# Patient Record
Sex: FEMALE | Race: WHITE | NOT HISPANIC OR LATINO | URBAN - METROPOLITAN AREA
[De-identification: names, ages, dates, MRNs, and addresses within clinical notes are randomized per-mention and may not be internally consistent; named-entity substitution may affect disease eponyms.]

---

## 2023-10-05 NOTE — ASU PATIENT PROFILE, ADULT - FALL HARM RISK - ATTEMPT OOB
History of skin cancer in adulthood  left ear lobe wide excision 7/2018  S/P carpal tunnel release  left 1996  S/P foot surgery  right 2007  S/P knee surgery  arthroscopy bilat knees - total 3 times - 2000, 2002, 2010  S/P laminectomy  1995  Ulnar nerve impingement  left 1996
No

## 2023-10-05 NOTE — ASU PATIENT PROFILE, ADULT - ABLE TO REACH PT
Left voicemail message with time and instruction; patient instructed to arrive at 08:30am; No solid food/daily/candy/gum after midnight; water allowed before 07:30am tomorrow; patient to come with photo ID/Insurance/credit card; dress comfortable; no jewelries/contact lens/valuables; no smoking/alcohol drinking/recreational drug use tonight; escort to have photo ID; address and callback number was given./no

## 2023-10-05 NOTE — ASU PATIENT PROFILE, ADULT - FALL HARM RISK - UNIVERSAL INTERVENTIONS
Bed in lowest position, wheels locked, appropriate side rails in place/Call bell, personal items and telephone in reach/Instruct patient to call for assistance before getting out of bed or chair/Non-slip footwear when patient is out of bed/[place] to call system/Physically safe environment - no spills, clutter or unnecessary equipment/Purposeful Proactive Rounding/Room/bathroom lighting operational, light cord in reach Bed in lowest position, wheels locked, appropriate side rails in place/Call bell, personal items and telephone in reach/Instruct patient to call for assistance before getting out of bed or chair/Non-slip footwear when patient is out of bed/[place] to call system/Physically safe environment - no spills, clutter or unnecessary equipment/Purposeful Proactive Rounding/Room/bathroom lighting operational, light cord in reach Bed in lowest position, wheels locked, appropriate side rails in place/Call bell, personal items and telephone in reach/Instruct patient to call for assistance before getting out of bed or chair/Non-slip footwear when patient is out of bed/[place] to call system/Physically safe environment - no spills, clutter or unnecessary equipment/Purposeful Proactive Rounding/Room/bathroom lighting operational, light cord in reach

## 2023-10-06 ENCOUNTER — OUTPATIENT (OUTPATIENT)
Dept: OUTPATIENT SERVICES | Facility: HOSPITAL | Age: 33
LOS: 1 days | Discharge: HOME | End: 2023-10-06
Payer: COMMERCIAL

## 2023-10-06 VITALS
OXYGEN SATURATION: 98 % | SYSTOLIC BLOOD PRESSURE: 123 MMHG | RESPIRATION RATE: 16 BRPM | DIASTOLIC BLOOD PRESSURE: 77 MMHG | HEART RATE: 86 BPM

## 2023-10-06 VITALS
OXYGEN SATURATION: 97 % | RESPIRATION RATE: 16 BRPM | SYSTOLIC BLOOD PRESSURE: 121 MMHG | HEART RATE: 95 BPM | HEIGHT: 64 IN | TEMPERATURE: 98 F | WEIGHT: 216.05 LBS | DIASTOLIC BLOOD PRESSURE: 83 MMHG

## 2023-10-06 PROCEDURE — 88305 TISSUE EXAM BY PATHOLOGIST: CPT | Mod: 26

## 2023-10-06 RX ORDER — SODIUM CHLORIDE 9 MG/ML
1000 INJECTION, SOLUTION INTRAVENOUS
Refills: 0 | Status: DISCONTINUED | OUTPATIENT
Start: 2023-10-06 | End: 2023-10-06

## 2023-10-06 RX ORDER — ACETAMINOPHEN 500 MG
650 TABLET ORAL ONCE
Refills: 0 | Status: DISCONTINUED | OUTPATIENT
Start: 2023-10-06 | End: 2023-10-06

## 2023-10-06 RX ORDER — OXYCODONE HYDROCHLORIDE 5 MG/1
5 TABLET ORAL ONCE
Refills: 0 | Status: DISCONTINUED | OUTPATIENT
Start: 2023-10-06 | End: 2023-10-06

## 2023-10-06 RX ORDER — ONDANSETRON 8 MG/1
4 TABLET, FILM COATED ORAL ONCE
Refills: 0 | Status: DISCONTINUED | OUTPATIENT
Start: 2023-10-06 | End: 2023-10-06

## 2023-10-06 RX ORDER — FENTANYL CITRATE 50 UG/ML
25 INJECTION INTRAVENOUS ONCE
Refills: 0 | Status: DISCONTINUED | OUTPATIENT
Start: 2023-10-06 | End: 2023-10-06

## 2023-10-06 NOTE — BRIEF OPERATIVE NOTE - NSICDXBRIEFPROCEDURE_GEN_ALL_CORE_FT
PROCEDURES:  Excision of accessory breast tissue of right axilla 06-Oct-2023 11:51:06  Vianney Mayorga

## 2023-10-06 NOTE — ASU DISCHARGE PLAN (ADULT/PEDIATRIC) - NS MD DC FALL RISK RISK
For information on Fall & Injury Prevention, visit: [email or web address]/[email or web address]-prevention-protects-[email or web address]-maintains-health-and-mobility OR  [email or web address]/news/fall-prevention-tips-to-avoid-injury OR  https://www.cdc.gov/steadi/patient.html For information on Fall & Injury Prevention, visit: [email or web address]://[email or web address]/news/fall-prevention-protects-and-maintains-health-and-mobility OR  [email or web address]://www.[email or web address]/news/fall-prevention-tips-[email or web address]-injury OR  https://www.cdc.gov/steadi/patient.html For information on Fall & Injury Prevention, visit: https://www.[facility]/news/fall-prevention-protects-and-maintains-[email or web address]-mobility OR  https://www.[facility]/news/fall-prevention-tips-to-avoid-injury OR  https://www.cdc.gov/steadi/patient.html

## 2023-10-19 LAB
SURGICAL PATHOLOGY STUDY: SIGNIFICANT CHANGE UP

## 2024-05-13 NOTE — ASU DISCHARGE PLAN (ADULT/PEDIATRIC) - PAIN MANAGEMENT
GOAL- Pt to tolerate recommended textures during course w/ no s/sx of aspiration Pt/family/caregiver will demonstrate understanding and carryover of dysphagia management (safe swallow guidelines, dysphagia diet).      SLP at next level of care anticipated; SLP during this course recommended; may use thin liquids during therapy with single sips following oral care and provide additional swallow interventions as appropriate following this study GOAL- Pt to tolerate recommended textures during course w/ no s/sx of aspiration Pt/family/caregiver will demonstrate understanding and carryover of dysphagia management (safe swallow guidelines, dysphagia diet).      SLP at next level of care anticipated; SLP during this course recommended; may use thin liquids during therapy with single sips following oral care and provide additional swallow interventions as appropriate following this study; SLP may trial more regular solids at bedside pending pt acceptance as pt would not accept more than one bite during testing with [facility]. Prescriptions electronically submitted to pharmacy from doctor's office

## (undated) DEVICE — DRSG GAUZE FLUFF 1PLY 18X30"

## (undated) DEVICE — SUT QUILL MONODERM 2-0 30CM 24MM

## (undated) DEVICE — BLADE SURGICAL #15 CARBON

## (undated) DEVICE — DRAPE TOWEL BLUE 17" X 24"

## (undated) DEVICE — SYR LUER LOK 30CC

## (undated) DEVICE — TUBING MICROAIRE ASPIRATION SET 12FT

## (undated) DEVICE — SUT PLAIN GUT FAST ABSORBING 5-0 PC-1

## (undated) DEVICE — MARKING PEN W RULER

## (undated) DEVICE — NDL SPINAL 22G X 3.5" (BLACK)

## (undated) DEVICE — SUT MONOCRYL 4-0 18" P-3 UNDYED

## (undated) DEVICE — DRAPE TOP SHEET 53" X 101"

## (undated) DEVICE — POSITIONER FOAM EGG CRATE ULNAR 2PCS (PINK)

## (undated) DEVICE — PACK BREAST

## (undated) DEVICE — ELCTR STRYKER NEPTUNE BLADE COATED, INSULATED 70MM

## (undated) DEVICE — DRSG COMBINE 5X9"

## (undated) DEVICE — DRSG DERMABOND PRINEO 60CM

## (undated) DEVICE — ELCTR BOVIE PENCIL SMOKE EVACUATION

## (undated) DEVICE — DRAIN RESERVOIR FOR JACKSON PRATT 100CC CARDINAL

## (undated) DEVICE — GLV 7.5 PROTEXIS (WHITE)

## (undated) DEVICE — LAP PAD 18 X 18"

## (undated) DEVICE — ELCTR BOVIE TIP BLADE INSULATED 2.75" EDGE

## (undated) DEVICE — SUT MONOCRYL 4-0 18" PS-2

## (undated) DEVICE — DRSG 3M TEGADERM CHG CHLORHEXIDINE GLUCONATE GEL PAD

## (undated) DEVICE — DRSG STERISTRIPS 0.5 X 4"

## (undated) DEVICE — DRSG TELFA 3 X 8

## (undated) DEVICE — SUT VICRYL 2-0 27" CT-1

## (undated) DEVICE — DRSG DERMABOND 0.7ML

## (undated) DEVICE — ELCTR PENCIL SMOKE EVACUATOR COATED PUSH BUTTON 70MM

## (undated) DEVICE — GLV 8 PROTEXIS (WHITE)

## (undated) DEVICE — Device

## (undated) DEVICE — WARMING BLANKET LOWER ADULT

## (undated) DEVICE — GOWN ROYAL SILK XL

## (undated) DEVICE — STAPLER SKIN VISI-STAT 35 WIDE

## (undated) DEVICE — SUT SILK 2-0 18" FS

## (undated) DEVICE — DRSG GAUZE MOISTURIZER 0.5 OZ 4X8

## (undated) DEVICE — DRSG SURGICAL BRA XL 40-42

## (undated) DEVICE — SLV COMPRESSION KNEE MED

## (undated) DEVICE — SUT MONOCRYL 3-0 18" PS-2 UNDYED